# Patient Record
Sex: FEMALE | Race: WHITE | NOT HISPANIC OR LATINO | Employment: OTHER | URBAN - METROPOLITAN AREA
[De-identification: names, ages, dates, MRNs, and addresses within clinical notes are randomized per-mention and may not be internally consistent; named-entity substitution may affect disease eponyms.]

---

## 2021-04-21 ENCOUNTER — HISTORICAL (OUTPATIENT)
Dept: ADMINISTRATIVE | Facility: HOSPITAL | Age: 61
End: 2021-04-21

## 2021-04-21 LAB — SARS-COV-2 AG RESP QL IA.RAPID: NEGATIVE

## 2021-04-22 ENCOUNTER — HISTORICAL (OUTPATIENT)
Dept: SURGERY | Facility: HOSPITAL | Age: 61
End: 2021-04-22

## 2021-05-05 ENCOUNTER — HISTORICAL (OUTPATIENT)
Dept: ADMINISTRATIVE | Facility: HOSPITAL | Age: 61
End: 2021-05-05

## 2021-06-02 ENCOUNTER — HISTORICAL (OUTPATIENT)
Dept: ADMINISTRATIVE | Facility: HOSPITAL | Age: 61
End: 2021-06-02

## 2021-08-04 ENCOUNTER — HISTORICAL (OUTPATIENT)
Dept: INFECTIOUS DISEASES | Facility: HOSPITAL | Age: 61
End: 2021-08-04

## 2022-01-06 ENCOUNTER — HISTORICAL (OUTPATIENT)
Dept: RADIOLOGY | Facility: HOSPITAL | Age: 62
End: 2022-01-06

## 2022-04-11 ENCOUNTER — HISTORICAL (OUTPATIENT)
Dept: ADMINISTRATIVE | Facility: HOSPITAL | Age: 62
End: 2022-04-11

## 2022-04-28 VITALS — HEIGHT: 62 IN | BODY MASS INDEX: 21.86 KG/M2 | WEIGHT: 118.81 LBS

## 2022-04-30 NOTE — H&P
Patient:   Willow Roberts             MRN: [id number]            FIN: [id number]               Age:   61 years     Sex:  Female     :  1960   Associated Diagnoses:   None   Author:   Bakari Armando MD      Patient presents today for surgery. There are no interval changes from formal history and physical examination performed in clinic.    Patient will undergo surgery as detailed in clinic note.    Dr. Laura Nieves

## 2022-05-04 NOTE — HISTORICAL [facility] CERNER
This is a historical note converted from [facility]. Formatting and pictures may have been removed.  Please reference [facility] for original formatting and attached multimedia. DATE OF SURGERY:?04/22/21  ?  PREOPERATIVE DIAGNOSES:  1.?left?distal radius fracture  ?   POSTOPERATIVE DIAGNOSES:  ?   Same  ?   PROCEDURE: Open reduction and internal fixation of distal radius fracture  ?  ATTENDING PHYSICIAN:?Bakari Armando MD  ?  RESIDENT PHYSICIANS: Dr Laura Nieves PGY3  ?  ANESTHESIA: Regional + General  ?  EBL: 20cc  ?  TOURNIQUET: 250 mmHg?39 min  ?  SPECIMEN: none  ?  IMPLANTS: Skeletal dynamics geminus distal radial locking plate with associated locking screws/smooth pegs as well as 3.5 mm cortical and locking screws  ?  COMPLICATIONS: none  ?  DISPOSITION: To recovery room in stable condition  ?  INDICATIONS FOR PROCEDURE:?Willow Roberts?is a?61 Years?Female?who presented to our clinic for follow up assessment and definitive management of their distal radius fracture.? While in clinic the risks, benefits, outcomes, and alternatives of conservative versus operative management were discussed with the patient. ?Informed consent was obtained for an open reduction and?internal fixation vs?dorsal wrist spanning vs external fixation?of?distal radius fracture.  ?  PROCEDURE IN DETAIL: ?The patient was brought into the operating room, positioned supine on the OR table. ?The arm was prepped and draped in the usual fashion. ?A preoperative pause was performed to confirm the site and side for surgery. ?Preoperative antibiotics were given.?  ?  A standard modified volar Samuel/trans-FCR?approach to the distal radius was performed.??We began by sharply incising skin and taking our incision though subcutaneous tissue and fat.? Electrocautery was used to obtain meticulous hemostasis.? The FCR tendon was identified and the sheath sharply incised inline with the fibers.? The FCR tendon was then retracted ulnarly to expose the underlying  FPL tendon.? The FPL tendon was also?retracted ulnarly to expose?pronator quadratus.??Pronator quadratus was then elevated in a subperiosteal fashion from the radial insertion.? The fracture was exposed and thoroughly irrigated with sterile saline. ?Using a combination of axial traction as well as extension and flexion forces the distal radial fracture was anatomically reduced.? The reduction was confirmed under intraoperative fluoroscopy.?  ?  We?then began stabilizing the fracture?by positioning?our plate on?radial shaft, holding it in place?using the oblong hole and a 3.5 mm cortical screw. ?We placed smooth K-wires in the trajectory target locking holes in the distal aspect of the plate to confirm the trajectory of the screws. ?This was screened under intraoperative fluoroscopy to confirm that all of our screw structures would be extra-articular. ?  ?  Once we were happy with the position of the plate, we placed a high compression locking screw on both the radial styloid fragment and lunate facet fragment to secure them to the plate. ?The remaining distal locking holes were filled with smooth locking pegs. ?We then turned our attention to the radial shaft. ?We stabilized this with a combination of?3.5 mm cortical and?locking screws. ?We again screened the fracture under intraoperative fluoroscopy and confirmed appropriate reduction and position of our hardware.?  ?  The wound was thoroughly irrigated with sterile saline. ?The tourniquet was let down, and meticulous hemostasis was obtained. ?The subcutaneous tissue was closed with a 2-0 Vicryl suture, while skin was closed with horizontal mattress 3-0 nylon sutures. ?A sterile dressing was then?placed to cover the patients wound?and?short-arm splint was applied. ?The anesthetic was then reversed, the patient transferred to a hospital stretcher, and brought to the recovery room in stable condition.??  ?  POSTOPERATIVE PLAN: ?The patient will follow up with us in  clinic in 2 weeks time for splint removal, repeat x-ray, wound check, suture removal, and instructions on beginning range of motion?exercises.  ?

## 2023-05-18 DIAGNOSIS — Z12.31 ENCOUNTER FOR SCREENING MAMMOGRAM FOR MALIGNANT NEOPLASM OF BREAST: ICD-10-CM

## 2023-05-18 DIAGNOSIS — N95.9 UNSPECIFIED MENOPAUSAL AND PERIMENOPAUSAL DISORDER: Primary | ICD-10-CM

## 2023-06-07 ENCOUNTER — HOSPITAL ENCOUNTER (OUTPATIENT)
Dept: RADIOLOGY | Facility: HOSPITAL | Age: 63
Discharge: HOME | End: 2023-06-07
Attending: NURSE PRACTITIONER
Payer: MEDICARE

## 2023-06-07 DIAGNOSIS — Z12.31 ENCOUNTER FOR SCREENING MAMMOGRAM FOR MALIGNANT NEOPLASM OF BREAST: ICD-10-CM

## 2023-06-07 DIAGNOSIS — N95.9 UNSPECIFIED MENOPAUSAL AND PERIMENOPAUSAL DISORDER: ICD-10-CM

## 2023-06-07 PROCEDURE — 77080 DXA BONE DENSITY AXIAL: CPT | Mod: TC

## 2023-06-07 PROCEDURE — 77063 BREAST TOMOSYNTHESIS BI: CPT | Mod: 26,,, | Performed by: RADIOLOGY

## 2023-06-07 PROCEDURE — 77067 MAMMO DIGITAL SCREENING BILAT WITH TOMO: ICD-10-PCS | Mod: 26,,, | Performed by: RADIOLOGY

## 2023-06-07 PROCEDURE — 77063 MAMMO DIGITAL SCREENING BILAT WITH TOMO: ICD-10-PCS | Mod: 26,,, | Performed by: RADIOLOGY

## 2023-06-07 PROCEDURE — 77067 SCR MAMMO BI INCL CAD: CPT | Mod: 26,,, | Performed by: RADIOLOGY

## 2023-06-07 PROCEDURE — 77067 SCR MAMMO BI INCL CAD: CPT | Mod: TC

## 2024-06-20 DIAGNOSIS — N95.9 UNSPECIFIED MENOPAUSAL AND PERIMENOPAUSAL DISORDER: Primary | ICD-10-CM

## 2024-06-20 DIAGNOSIS — Z12.31 BREAST CANCER SCREENING BY MAMMOGRAM: ICD-10-CM

## 2024-06-26 ENCOUNTER — HOSPITAL ENCOUNTER (OUTPATIENT)
Dept: RADIOLOGY | Facility: HOSPITAL | Age: 64
Discharge: HOME | End: 2024-06-26
Attending: NURSE PRACTITIONER
Payer: MEDICARE

## 2024-06-26 DIAGNOSIS — Z12.31 BREAST CANCER SCREENING BY MAMMOGRAM: ICD-10-CM

## 2024-06-26 PROCEDURE — 77067 SCR MAMMO BI INCL CAD: CPT | Mod: TC

## 2025-07-02 DIAGNOSIS — Z12.31 SCREENING MAMMOGRAM FOR BREAST CANCER: ICD-10-CM

## 2025-07-02 DIAGNOSIS — N95.9 MENOPAUSAL PROBLEM: Primary | ICD-10-CM

## 2025-07-02 DIAGNOSIS — R10.31 ABDOMINAL PAIN, RIGHT LOWER QUADRANT: ICD-10-CM

## 2025-07-17 ENCOUNTER — HOSPITAL ENCOUNTER (OUTPATIENT)
Dept: RADIOLOGY | Facility: HOSPITAL | Age: 65
Discharge: HOME | End: 2025-07-17
Attending: NURSE PRACTITIONER
Payer: MEDICARE

## 2025-07-17 DIAGNOSIS — R10.31 ABDOMINAL PAIN, RIGHT LOWER QUADRANT: ICD-10-CM

## 2025-07-17 DIAGNOSIS — Z12.31 SCREENING MAMMOGRAM FOR BREAST CANCER: ICD-10-CM

## 2025-07-17 DIAGNOSIS — N95.9 MENOPAUSAL PROBLEM: ICD-10-CM

## 2025-07-17 PROCEDURE — 77080 DXA BONE DENSITY AXIAL: CPT | Mod: TC

## 2025-07-17 PROCEDURE — 77063 BREAST TOMOSYNTHESIS BI: CPT | Mod: TC

## 2025-07-17 PROCEDURE — 76830 TRANSVAGINAL US NON-OB: CPT | Mod: TC
